# Patient Record
Sex: MALE
[De-identification: names, ages, dates, MRNs, and addresses within clinical notes are randomized per-mention and may not be internally consistent; named-entity substitution may affect disease eponyms.]

---

## 2021-10-27 PROBLEM — Z00.00 ENCOUNTER FOR PREVENTIVE HEALTH EXAMINATION: Status: ACTIVE | Noted: 2021-10-27

## 2021-10-28 ENCOUNTER — APPOINTMENT (OUTPATIENT)
Dept: ORTHOPEDIC SURGERY | Facility: CLINIC | Age: 61
End: 2021-10-28
Payer: COMMERCIAL

## 2021-10-28 VITALS — WEIGHT: 150 LBS | BODY MASS INDEX: 22.73 KG/M2 | HEIGHT: 68 IN

## 2021-10-28 DIAGNOSIS — Z78.9 OTHER SPECIFIED HEALTH STATUS: ICD-10-CM

## 2021-10-28 DIAGNOSIS — M17.0 BILATERAL PRIMARY OSTEOARTHRITIS OF KNEE: ICD-10-CM

## 2021-10-28 PROCEDURE — 99203 OFFICE O/P NEW LOW 30 MIN: CPT

## 2021-10-28 PROCEDURE — 73562 X-RAY EXAM OF KNEE 3: CPT | Mod: 50

## 2021-10-28 NOTE — DISCUSSION/SUMMARY
[Medication Risks Reviewed] : Medication risks reviewed [Surgical risks reviewed] : Surgical risks reviewed [de-identified] : This is a chronic condition that the patient has. We discussed different treatment options including medication, injections, physical therapy and surgery. My recommendation is that he use over-the-counter medication at this time. I recommend that he do home exercises for strengthening and stretching. These were shown to him. If his symptoms worsen he'll let me know followup will be as needed.

## 2021-10-28 NOTE — PHYSICAL EXAM
[de-identified] : Bilateral knee exam shows no warmth or swelling. There is a full range of motion. There is patellofemoral crepitus. His range of motion is full neurovascular exam is normal he does have tightness of his quadriceps. Negative Elton [de-identified] : Bilateral knee x-ray shows evidence of degenerative arthritis from mild to moderate degree

## 2021-10-28 NOTE — HISTORY OF PRESENT ILLNESS
[de-identified] : Location: Bilateral knees \par Duration: 1 month\par Context: atraumatic\par Quality: sharp\par Aggravating factors: stairs, standing up\par Associated symptoms: clicking\par Conservative treatment: N/A\par Prior studies: N/A\par \par Referred by Dr. Dena Hall

## 2024-02-02 ENCOUNTER — APPOINTMENT (OUTPATIENT)
Dept: ULTRASOUND IMAGING | Facility: CLINIC | Age: 64
End: 2024-02-02